# Patient Record
Sex: FEMALE | Race: OTHER | HISPANIC OR LATINO
[De-identification: names, ages, dates, MRNs, and addresses within clinical notes are randomized per-mention and may not be internally consistent; named-entity substitution may affect disease eponyms.]

---

## 2022-11-15 PROBLEM — Z00.00 ENCOUNTER FOR PREVENTIVE HEALTH EXAMINATION: Status: ACTIVE | Noted: 2022-11-15

## 2022-11-22 ENCOUNTER — APPOINTMENT (OUTPATIENT)
Dept: OBGYN | Facility: CLINIC | Age: 27
End: 2022-11-22

## 2022-11-22 VITALS
WEIGHT: 140 LBS | DIASTOLIC BLOOD PRESSURE: 76 MMHG | HEIGHT: 63 IN | SYSTOLIC BLOOD PRESSURE: 114 MMHG | BODY MASS INDEX: 24.8 KG/M2

## 2022-11-22 DIAGNOSIS — Z82.49 FAMILY HISTORY OF ISCHEMIC HEART DISEASE AND OTHER DISEASES OF THE CIRCULATORY SYSTEM: ICD-10-CM

## 2022-11-22 DIAGNOSIS — Z78.9 OTHER SPECIFIED HEALTH STATUS: ICD-10-CM

## 2022-11-22 PROCEDURE — 99202 OFFICE O/P NEW SF 15 MIN: CPT | Mod: 25

## 2022-11-22 PROCEDURE — 81025 URINE PREGNANCY TEST: CPT

## 2022-11-22 PROCEDURE — 99385 PREV VISIT NEW AGE 18-39: CPT

## 2022-11-22 RX ORDER — PRENATAL WITH FERROUS FUM AND FOLIC ACID 3080; 920; 120; 400; 22; 1.84; 3; 20; 10; 1; 12; 200; 27; 25; 2 [IU]/1; [IU]/1; MG/1; [IU]/1; MG/1; MG/1; MG/1; MG/1; MG/1; MG/1; UG/1; MG/1; MG/1; MG/1; MG/1
27-1 TABLET ORAL
Qty: 30 | Refills: 8 | Status: ACTIVE | COMMUNITY
Start: 2022-11-22 | End: 1900-01-01

## 2022-11-22 NOTE — PROCEDURE
[Cervical Pap Smear] : cervical Pap smear [Liquid Base] : liquid base [GC & Chlamydia via Pap] : GC & Chlamydia via Pap [Tolerated Well] : the patient tolerated the procedure well [No Complications] : there were no complications [Transvaginal OB Sonogram] : Transvaginal OB Sonogram [Intrauterine Pregnancy] : intrauterine pregnancy [Transvaginal OB Sonogram WNL] : Transvaginal OB Sonogram WNL [Yolk Sac] : no yolk sac [Fetal Heart] : no fetal heart [FreeTextEntry1] : IUP with gestational sac but NO fetal pole , NO FHR\par likely too early to date\par \par Ob sono in 3 weeks to obtain and confirm viability and dating

## 2022-11-22 NOTE — PHYSICAL EXAM
[Appropriately responsive] : appropriately responsive [Alert] : alert [No Acute Distress] : no acute distress [Regular Rate Rhythm] : regular rate rhythm [Soft] : soft [Non-tender] : non-tender [Non-distended] : non-distended [No HSM] : No HSM [No Lesions] : no lesions [No Mass] : no mass [Oriented x3] : oriented x3 [Examination Of The Breasts] : a normal appearance [No Masses] : no breast masses were palpable [Labia Majora] : normal [Labia Minora] : normal [Normal] : normal [Enlarged ___ wks] : enlarged [unfilled] ~Uweeks [Uterine Adnexae] : normal

## 2022-11-22 NOTE — PLAN
[FreeTextEntry1] : PLAN:\par \par IUP with gestational sac but NO fetal pole , NO FHR\par likely too early to date\par \par Ob sono in 3 weeks to obtain and confirm viability and dating. Referral given and dating OB sono appointment made\par \par HCG ordered\par PAP smear withh HPV done\par GC/CT- done\par \par Miscarriage precautions given\par \par RTO 3 weeks for NPN pending dating sono\par

## 2022-11-22 NOTE — HISTORY OF PRESENT ILLNESS
[N] : Patient does not use contraception [Y] : Positive pregnancy history [Frequency: Q ___ days] : menstrual periods occur approximately every [unfilled] days [Menarche Age: ____] : age at menarche was [unfilled] [FreeTextEntry1] : 26yo  presents to establish care and confirm pregnancy. ALso due for PAP smear\par \par LMP 10/17/2022\par \par Desired pregnancy\par Not taking prenatals\par Denies any vaginal bleeding, discharge or carmpaing\par \par OBHX:  , FT, male, no complications\par \par  [PGHxTotal] : 2 [[facility]] : 1 [PGHxPremature] : 0 [PGHxAbortions] : 0 [[facility]] : [facility] [PGHxABInduced] : 0 [PGHxABSpont] : 0 [PGHxEctopic] : 0 [PGHxMultBirths] : 0

## 2022-11-23 LAB — HCG SERPL-MCNC: 2290 MIU/ML

## 2022-11-25 LAB
C TRACH RRNA SPEC QL NAA+PROBE: NOT DETECTED
HPV HIGH+LOW RISK DNA PNL CVX: NOT DETECTED
N GONORRHOEA RRNA SPEC QL NAA+PROBE: NOT DETECTED
SOURCE TP AMPLIFICATION: NORMAL

## 2022-12-05 LAB — CYTOLOGY CVX/VAG DOC THIN PREP: NORMAL

## 2022-12-16 ENCOUNTER — APPOINTMENT (OUTPATIENT)
Dept: ANTEPARTUM | Facility: CLINIC | Age: 27
End: 2022-12-16

## 2022-12-16 ENCOUNTER — ASOB RESULT (OUTPATIENT)
Age: 27
End: 2022-12-16

## 2022-12-16 PROCEDURE — 76801 OB US < 14 WKS SINGLE FETUS: CPT

## 2022-12-22 ENCOUNTER — APPOINTMENT (OUTPATIENT)
Dept: OBGYN | Facility: CLINIC | Age: 27
End: 2022-12-22

## 2022-12-22 ENCOUNTER — NON-APPOINTMENT (OUTPATIENT)
Age: 27
End: 2022-12-22

## 2022-12-22 VITALS
BODY MASS INDEX: 26.59 KG/M2 | WEIGHT: 150.1 LBS | SYSTOLIC BLOOD PRESSURE: 100 MMHG | HEIGHT: 63 IN | DIASTOLIC BLOOD PRESSURE: 70 MMHG

## 2022-12-22 DIAGNOSIS — Z87.42 PERSONAL HISTORY OF OTHER DISEASES OF THE FEMALE GENITAL TRACT: ICD-10-CM

## 2022-12-22 DIAGNOSIS — Z32.01 ENCOUNTER FOR PREGNANCY TEST, RESULT POSITIVE: ICD-10-CM

## 2022-12-22 DIAGNOSIS — Z12.4 ENCOUNTER FOR SCREENING FOR MALIGNANT NEOPLASM OF CERVIX: ICD-10-CM

## 2022-12-22 DIAGNOSIS — Z86.19 PERSONAL HISTORY OF OTHER INFECTIOUS AND PARASITIC DISEASES: ICD-10-CM

## 2022-12-22 PROCEDURE — 81003 URINALYSIS AUTO W/O SCOPE: CPT | Mod: QW

## 2022-12-22 PROCEDURE — 0500F INITIAL PRENATAL CARE VISIT: CPT

## 2022-12-22 PROCEDURE — 36415 COLL VENOUS BLD VENIPUNCTURE: CPT

## 2022-12-22 RX ORDER — METOCLOPRAMIDE 10 MG/1
10 TABLET ORAL 4 TIMES DAILY
Qty: 120 | Refills: 1 | Status: ACTIVE | COMMUNITY
Start: 2022-12-22 | End: 1900-01-01

## 2022-12-28 LAB
ABO + RH PNL BLD: NORMAL
ALBUMIN SERPL ELPH-MCNC: 4.3 G/DL
ALP BLD-CCNC: 63 U/L
ALT SERPL-CCNC: 9 U/L
ANION GAP SERPL CALC-SCNC: 14 MMOL/L
APPEARANCE: CLEAR
AR GENE MUT ANL BLD/T: NORMAL
AST SERPL-CCNC: 15 U/L
B19V IGG SER QL IA: 5.93 INDEX
B19V IGG+IGM SER-IMP: NORMAL
B19V IGG+IGM SER-IMP: POSITIVE
B19V IGM FLD-ACNC: 0.1 INDEX
B19V IGM SER-ACNC: NEGATIVE
BASOPHILS # BLD AUTO: 0.06 K/UL
BASOPHILS NFR BLD AUTO: 0.6 %
BILIRUB SERPL-MCNC: 0.2 MG/DL
BILIRUBIN URINE: NEGATIVE
BLD GP AB SCN SERPL QL: NORMAL
BLOOD URINE: NEGATIVE
BUN SERPL-MCNC: 4 MG/DL
CALCIUM SERPL-MCNC: 9.2 MG/DL
CHLORIDE SERPL-SCNC: 98 MMOL/L
CMV IGG SERPL QL: 6 U/ML
CMV IGG SERPL-IMP: POSITIVE
CMV IGM SERPL QL: <8 AU/ML
CMV IGM SERPL QL: NEGATIVE
CO2 SERPL-SCNC: 21 MMOL/L
COLOR: YELLOW
CREAT SERPL-MCNC: 0.4 MG/DL
EGFR: 137 ML/MIN/1.73M2
EOSINOPHIL # BLD AUTO: 0.35 K/UL
EOSINOPHIL NFR BLD AUTO: 3.6 %
ESTIMATED AVERAGE GLUCOSE: 114 MG/DL
GLUCOSE QUALITATIVE U: NEGATIVE
GLUCOSE SERPL-MCNC: 57 MG/DL
HBA1C MFR BLD HPLC: 5.6 %
HBV SURFACE AG SER QL: NONREACTIVE
HCT VFR BLD CALC: 38 %
HCV AB SER QL: NONREACTIVE
HCV S/CO RATIO: 0.13 S/CO
HGB A MFR BLD: 97.3 %
HGB A2 MFR BLD: 2.7 %
HGB BLD-MCNC: 12.6 G/DL
HGB FRACT BLD-IMP: NORMAL
HIV1+2 AB SPEC QL IA.RAPID: NONREACTIVE
IMM GRANULOCYTES NFR BLD AUTO: 0.3 %
KETONES URINE: NEGATIVE
LEAD BLD-MCNC: <1 UG/DL
LEUKOCYTE ESTERASE URINE: NEGATIVE
LYMPHOCYTES # BLD AUTO: 3.32 K/UL
LYMPHOCYTES NFR BLD AUTO: 33.9 %
M TB IFN-G BLD-IMP: NEGATIVE
MAN DIFF?: NORMAL
MCHC RBC-ENTMCNC: 31.2 PG
MCHC RBC-ENTMCNC: 33.2 GM/DL
MCV RBC AUTO: 94.1 FL
MEV IGG FLD QL IA: 16.3 AU/ML
MEV IGG+IGM SER-IMP: NORMAL
MONOCYTES # BLD AUTO: 0.75 K/UL
MONOCYTES NFR BLD AUTO: 7.7 %
MUV AB SER-ACNC: POSITIVE
MUV IGG SER QL IA: 41.2 AU/ML
NEUTROPHILS # BLD AUTO: 5.29 K/UL
NEUTROPHILS NFR BLD AUTO: 53.9 %
NITRITE URINE: NEGATIVE
PH URINE: 6
PLATELET # BLD AUTO: 401 K/UL
POTASSIUM SERPL-SCNC: 4.9 MMOL/L
PROT SERPL-MCNC: 7.4 G/DL
PROTEIN URINE: NEGATIVE
QUANTIFERON TB PLUS MITOGEN MINUS NIL: >10 IU/ML
QUANTIFERON TB PLUS NIL: 0.02 IU/ML
QUANTIFERON TB PLUS TB1 MINUS NIL: 0 IU/ML
QUANTIFERON TB PLUS TB2 MINUS NIL: 0 IU/ML
RBC # BLD: 4.04 M/UL
RBC # FLD: 13.5 %
RUBV IGG FLD-ACNC: 3.8 INDEX
RUBV IGG SER-IMP: POSITIVE
SODIUM SERPL-SCNC: 134 MMOL/L
SPECIFIC GRAVITY URINE: 1.02
T GONDII AB SER-IMP: NEGATIVE
T GONDII AB SER-IMP: POSITIVE
T GONDII IGG SER QL: 66.4 IU/ML
T GONDII IGM SER QL: <3 AU/ML
T PALLIDUM AB SER QL IA: NEGATIVE
TSH SERPL-ACNC: 1.89 UIU/ML
UROBILINOGEN URINE: NORMAL
VZV AB TITR SER: POSITIVE
VZV IGG SER IF-ACNC: 345 INDEX
WBC # FLD AUTO: 9.8 K/UL

## 2022-12-30 LAB — FMR1 GENE MUT ANL BLD/T: NORMAL

## 2023-01-05 ENCOUNTER — ASOB RESULT (OUTPATIENT)
Age: 28
End: 2023-01-05

## 2023-01-05 ENCOUNTER — APPOINTMENT (OUTPATIENT)
Dept: MATERNAL FETAL MEDICINE | Facility: CLINIC | Age: 28
End: 2023-01-05
Payer: MEDICAID

## 2023-01-05 PROCEDURE — 99442: CPT

## 2023-01-09 ENCOUNTER — APPOINTMENT (OUTPATIENT)
Dept: ANTEPARTUM | Facility: CLINIC | Age: 28
End: 2023-01-09
Payer: MEDICAID

## 2023-01-09 ENCOUNTER — ASOB RESULT (OUTPATIENT)
Age: 28
End: 2023-01-09

## 2023-01-09 PROCEDURE — 36415 COLL VENOUS BLD VENIPUNCTURE: CPT

## 2023-01-09 PROCEDURE — 76813 OB US NUCHAL MEAS 1 GEST: CPT

## 2023-01-12 LAB
ADDITIONAL US: NORMAL
COMMENTS: AFP: NORMAL
CRL SCAN TWIN B: NORMAL
CRL SCAN: NORMAL
CROWN RUMP LENGTH TWIN B: NORMAL
CROWN RUMP LENGTH: 55.8 MM
DOWN SYNDROME AGE RISK: NORMAL
DOWN SYNDROME INTERPRETATION: NORMAL
DOWN SYNDROME SCREENING RISK: NORMAL
GEST. AGE ON COLLECTION DATE: 12 WEEKS
HCG MOM: 0.69
HCG VALUE: 75.7 IU/ML
MATERNAL AGE AT EDD: 30.3 YR
NOTE: AFP: NORMAL
NT MOM TWIN B: NORMAL
NT TWIN B: NORMAL
NUCHAL TRANSLUCENCY (NT): 1 MM
NUCHAL TRANSLUCENCY MOM: 0.77
NUMBER OF FETUSES: 1
PAPP-A MOM: 0.89
PAPP-A VALUE: 766.8 NG/ML
RACE: NORMAL
RESULTS AFP: NORMAL
SONOGRAPHER ID#: NORMAL
SUBMIT PART 2 SAMPLE USING: NORMAL
TEST RESULTS: AFP: NORMAL
TRISOMY 18 AGE RISK: NORMAL
TRISOMY 18 INTERPRETATION: NORMAL
TRISOMY 18 SCREENING RISK: NORMAL
WEIGHT AFP: 140 LBS

## 2023-01-16 ENCOUNTER — NON-APPOINTMENT (OUTPATIENT)
Age: 28
End: 2023-01-16

## 2023-01-26 ENCOUNTER — APPOINTMENT (OUTPATIENT)
Dept: OBGYN | Facility: CLINIC | Age: 28
End: 2023-01-26
Payer: MEDICAID

## 2023-01-26 VITALS
SYSTOLIC BLOOD PRESSURE: 110 MMHG | WEIGHT: 157.44 LBS | DIASTOLIC BLOOD PRESSURE: 60 MMHG | BODY MASS INDEX: 27.89 KG/M2 | HEIGHT: 63 IN

## 2023-01-26 PROCEDURE — 0502F SUBSEQUENT PRENATAL CARE: CPT

## 2023-02-03 ENCOUNTER — NON-APPOINTMENT (OUTPATIENT)
Age: 28
End: 2023-02-03

## 2023-02-03 RX ORDER — METRONIDAZOLE 7.5 MG/G
0.75 GEL VAGINAL
Qty: 1 | Refills: 1 | Status: ACTIVE | COMMUNITY
Start: 2023-02-03 | End: 1900-01-01

## 2023-02-06 ENCOUNTER — APPOINTMENT (OUTPATIENT)
Dept: ANTEPARTUM | Facility: CLINIC | Age: 28
End: 2023-02-06
Payer: MEDICAID

## 2023-02-06 PROCEDURE — 36415 COLL VENOUS BLD VENIPUNCTURE: CPT

## 2023-02-09 ENCOUNTER — ASOB RESULT (OUTPATIENT)
Age: 28
End: 2023-02-09

## 2023-02-09 ENCOUNTER — APPOINTMENT (OUTPATIENT)
Dept: MATERNAL FETAL MEDICINE | Facility: CLINIC | Age: 28
End: 2023-02-09
Payer: MEDICAID

## 2023-02-09 PROCEDURE — 99442: CPT

## 2023-02-13 LAB
ADDITIONAL US: NORMAL
AFP MOM: 1.03
AFP VALUE: 32.9 NG/ML
COLLECTED ON 2: NORMAL
COLLECTED ON: NORMAL
CRL SCAN TWIN B: NORMAL
CRL SCAN: NORMAL
CROWN RUMP LENGTH TWIN B: NORMAL
CROWN RUMP LENGTH: 55.8 MM
DIA MOM: 0.9
DIA VALUE: 139.6 PG/ML
DOWN SYNDROME AGE RISK: NORMAL
DOWN SYNDROME INTERPRETATION: NORMAL
DOWN SYNDROME SCREENING RISK: NORMAL
FIRST TRIMESTER SAMPLE: NORMAL
GEST. AGE ON COLLECTION DATE: 12 WEEKS
GESTATIONAL AGE: 16 WEEKS
HCG MOM: 0.86
HCG VALUE: 32.7 IU/ML
INSULIN DEP DIABETES: NO
MATERNAL AGE AT EDD: 30.3 YR
NT MOM TWIN B: NORMAL
NT TWIN B: NORMAL
NUCHAL TRANSLUCENCY (NT): 1 MM
NUCHAL TRANSLUCENCY MOM: 0.77
NUMBER OF FETUSES: 1
OPEN SPINA BIFIDA: NORMAL
OSB INTERPRETATION: NORMAL
PAPP-A MOM: 0.89
PAPP-A VALUE: 766.8 NG/ML
RACE: NORMAL
SECOND TRIMESTER SAMPLE: NORMAL
SEQUENTIAL 2 COMMENTS: NORMAL
SEQUENTIAL 2 NOTE: NORMAL
SEQUENTIAL 2 RESULTS: NORMAL
SEQUENTIAL 2 TEST RESULTS: NORMAL
SONOGRAPHER ID#: NORMAL
TRISOMY 18 AGE RISK: NORMAL
TRISOMY 18 INTERPRETATION: NORMAL
TRISOMY 18 SCREENING RISK: NORMAL
UE3 MOM: 0.81
UE3 VALUE: 0.76 NG/ML
WEIGHT AFP: 140 LBS
WEIGHT: 158 LBS

## 2023-02-17 ENCOUNTER — APPOINTMENT (OUTPATIENT)
Dept: OBGYN | Facility: CLINIC | Age: 28
End: 2023-02-17
Payer: MEDICAID

## 2023-02-17 VITALS
BODY MASS INDEX: 28.17 KG/M2 | SYSTOLIC BLOOD PRESSURE: 120 MMHG | DIASTOLIC BLOOD PRESSURE: 72 MMHG | WEIGHT: 159 LBS | HEIGHT: 63 IN

## 2023-02-17 PROCEDURE — 0502F SUBSEQUENT PRENATAL CARE: CPT

## 2023-03-09 ENCOUNTER — APPOINTMENT (OUTPATIENT)
Dept: ANTEPARTUM | Facility: CLINIC | Age: 28
End: 2023-03-09
Payer: MEDICAID

## 2023-03-09 ENCOUNTER — ASOB RESULT (OUTPATIENT)
Age: 28
End: 2023-03-09

## 2023-03-09 PROCEDURE — 76817 TRANSVAGINAL US OBSTETRIC: CPT | Mod: 59

## 2023-03-09 PROCEDURE — 76805 OB US >/= 14 WKS SNGL FETUS: CPT

## 2023-03-16 ENCOUNTER — APPOINTMENT (OUTPATIENT)
Dept: OBGYN | Facility: CLINIC | Age: 28
End: 2023-03-16
Payer: MEDICAID

## 2023-03-16 VITALS
BODY MASS INDEX: 29.46 KG/M2 | WEIGHT: 166.25 LBS | DIASTOLIC BLOOD PRESSURE: 76 MMHG | SYSTOLIC BLOOD PRESSURE: 126 MMHG | HEIGHT: 63 IN

## 2023-03-16 PROCEDURE — 0502F SUBSEQUENT PRENATAL CARE: CPT

## 2023-04-04 DIAGNOSIS — Z3A.09 9 WEEKS GESTATION OF PREGNANCY: ICD-10-CM

## 2023-04-12 LAB — CFTR MUT TESTED BLD/T: NEGATIVE

## 2023-04-13 ENCOUNTER — APPOINTMENT (OUTPATIENT)
Dept: OBGYN | Facility: CLINIC | Age: 28
End: 2023-04-13
Payer: MEDICAID

## 2023-04-13 VITALS
BODY MASS INDEX: 29.95 KG/M2 | WEIGHT: 169 LBS | DIASTOLIC BLOOD PRESSURE: 60 MMHG | SYSTOLIC BLOOD PRESSURE: 100 MMHG | HEIGHT: 63 IN

## 2023-04-13 PROCEDURE — 0502F SUBSEQUENT PRENATAL CARE: CPT

## 2023-04-14 LAB
BASOPHILS # BLD AUTO: 0.02 K/UL
BASOPHILS NFR BLD AUTO: 0.3 %
EOSINOPHIL # BLD AUTO: 0.31 K/UL
EOSINOPHIL NFR BLD AUTO: 4 %
GLUCOSE 1H P 50 G GLC PO SERPL-MCNC: 170 MG/DL
HCT VFR BLD CALC: 33.4 %
HGB BLD-MCNC: 11 G/DL
IMM GRANULOCYTES NFR BLD AUTO: 0.3 %
LYMPHOCYTES # BLD AUTO: 1.89 K/UL
LYMPHOCYTES NFR BLD AUTO: 24.4 %
MAN DIFF?: NORMAL
MCHC RBC-ENTMCNC: 31.6 PG
MCHC RBC-ENTMCNC: 32.9 GM/DL
MCV RBC AUTO: 96 FL
MONOCYTES # BLD AUTO: 0.6 K/UL
MONOCYTES NFR BLD AUTO: 7.7 %
NEUTROPHILS # BLD AUTO: 4.92 K/UL
NEUTROPHILS NFR BLD AUTO: 63.3 %
PLATELET # BLD AUTO: 274 K/UL
RBC # BLD: 3.48 M/UL
RBC # FLD: 13.1 %
WBC # FLD AUTO: 7.76 K/UL

## 2023-04-14 RX ORDER — FERROUS SULFATE TAB EC 325 MG (65 MG FE EQUIVALENT) 325 (65 FE) MG
325 (65 FE) TABLET DELAYED RESPONSE ORAL
Qty: 90 | Refills: 2 | Status: ACTIVE | COMMUNITY
Start: 2023-04-14 | End: 1900-01-01

## 2023-04-17 ENCOUNTER — NON-APPOINTMENT (OUTPATIENT)
Age: 28
End: 2023-04-17

## 2023-04-27 ENCOUNTER — NON-APPOINTMENT (OUTPATIENT)
Age: 28
End: 2023-04-27

## 2023-05-04 ENCOUNTER — APPOINTMENT (OUTPATIENT)
Dept: OBGYN | Facility: CLINIC | Age: 28
End: 2023-05-04
Payer: MEDICAID

## 2023-05-04 VITALS
WEIGHT: 170 LBS | DIASTOLIC BLOOD PRESSURE: 70 MMHG | HEIGHT: 63 IN | SYSTOLIC BLOOD PRESSURE: 100 MMHG | BODY MASS INDEX: 30.12 KG/M2

## 2023-05-04 LAB
BILIRUB UR QL STRIP: NORMAL
GLUCOSE UR-MCNC: NORMAL
HCG UR QL: 0.2 EU/DL
HGB UR QL STRIP.AUTO: ABNORMAL
KETONES UR-MCNC: NORMAL
LEUKOCYTE ESTERASE UR QL STRIP: ABNORMAL
NITRITE UR QL STRIP: NORMAL
PH UR STRIP: 5.5
PROT UR STRIP-MCNC: NORMAL
SP GR UR STRIP: 1.03

## 2023-05-04 PROCEDURE — 0502F SUBSEQUENT PRENATAL CARE: CPT

## 2023-05-12 ENCOUNTER — ASOB RESULT (OUTPATIENT)
Age: 28
End: 2023-05-12

## 2023-05-12 ENCOUNTER — APPOINTMENT (OUTPATIENT)
Dept: ANTEPARTUM | Facility: CLINIC | Age: 28
End: 2023-05-12
Payer: MEDICAID

## 2023-05-12 PROCEDURE — 76816 OB US FOLLOW-UP PER FETUS: CPT

## 2023-05-17 LAB
BILIRUB UR QL STRIP: NORMAL
CLARITY UR: CLEAR
COLLECTION METHOD: NORMAL
GLUCOSE UR-MCNC: NORMAL
HCG UR QL: 0.2 EU/DL
HGB UR QL STRIP.AUTO: NORMAL
KETONES UR-MCNC: NORMAL
LEUKOCYTE ESTERASE UR QL STRIP: NORMAL
NITRITE UR QL STRIP: NORMAL
PH UR STRIP: 5.5
PROT UR STRIP-MCNC: NORMAL
SP GR UR STRIP: 1.03

## 2023-05-26 ENCOUNTER — APPOINTMENT (OUTPATIENT)
Dept: OBGYN | Facility: CLINIC | Age: 28
End: 2023-05-26
Payer: MEDICAID

## 2023-05-26 VITALS
HEIGHT: 63 IN | WEIGHT: 170.13 LBS | DIASTOLIC BLOOD PRESSURE: 60 MMHG | SYSTOLIC BLOOD PRESSURE: 98 MMHG | BODY MASS INDEX: 30.14 KG/M2

## 2023-05-26 LAB
BILIRUB UR QL STRIP: NORMAL
GLUCOSE UR-MCNC: NORMAL
HCG UR QL: 0.2 EU/DL
HGB UR QL STRIP.AUTO: NORMAL
KETONES UR-MCNC: NORMAL
LEUKOCYTE ESTERASE UR QL STRIP: NORMAL
NITRITE UR QL STRIP: NORMAL
PH UR STRIP: 7
PROT UR STRIP-MCNC: NORMAL
SP GR UR STRIP: 1.02

## 2023-05-26 PROCEDURE — 0502F SUBSEQUENT PRENATAL CARE: CPT

## 2023-06-01 ENCOUNTER — ASOB RESULT (OUTPATIENT)
Age: 28
End: 2023-06-01

## 2023-06-01 ENCOUNTER — APPOINTMENT (OUTPATIENT)
Dept: ANTEPARTUM | Facility: CLINIC | Age: 28
End: 2023-06-01
Payer: MEDICAID

## 2023-06-01 PROCEDURE — 76819 FETAL BIOPHYS PROFIL W/O NST: CPT

## 2023-06-09 ENCOUNTER — APPOINTMENT (OUTPATIENT)
Dept: OBGYN | Facility: CLINIC | Age: 28
End: 2023-06-09
Payer: MEDICAID

## 2023-06-09 ENCOUNTER — RESULT CHARGE (OUTPATIENT)
Age: 28
End: 2023-06-09

## 2023-06-09 VITALS
SYSTOLIC BLOOD PRESSURE: 110 MMHG | WEIGHT: 175.13 LBS | DIASTOLIC BLOOD PRESSURE: 70 MMHG | HEIGHT: 63 IN | BODY MASS INDEX: 31.03 KG/M2

## 2023-06-09 LAB
BILIRUB UR QL STRIP: NORMAL
CLARITY UR: CLEAR
COLLECTION METHOD: NORMAL
GLUCOSE UR-MCNC: NORMAL
HCG UR QL: 0.2 EU/DL
HGB UR QL STRIP.AUTO: NORMAL
KETONES UR-MCNC: NORMAL
LEUKOCYTE ESTERASE UR QL STRIP: NORMAL
NITRITE UR QL STRIP: NORMAL
PH UR STRIP: 6
PROT UR STRIP-MCNC: NORMAL
SP GR UR STRIP: 1.03

## 2023-06-09 PROCEDURE — 0502F SUBSEQUENT PRENATAL CARE: CPT

## 2023-06-22 ENCOUNTER — APPOINTMENT (OUTPATIENT)
Dept: OBGYN | Facility: CLINIC | Age: 28
End: 2023-06-22
Payer: MEDICAID

## 2023-06-22 VITALS
DIASTOLIC BLOOD PRESSURE: 60 MMHG | SYSTOLIC BLOOD PRESSURE: 100 MMHG | WEIGHT: 176 LBS | BODY MASS INDEX: 31.18 KG/M2 | HEIGHT: 63 IN

## 2023-06-22 DIAGNOSIS — N76.0 ACUTE VAGINITIS: ICD-10-CM

## 2023-06-22 DIAGNOSIS — Z13.71 ENCOUNTER FOR NONPROCREATIVE SCREENING FOR GENETIC DISEASE CARRIER STATUS: ICD-10-CM

## 2023-06-22 DIAGNOSIS — Z34.92 ENCOUNTER FOR SUPERVISION OF NORMAL PREGNANCY, UNSPECIFIED, SECOND TRIMESTER: ICD-10-CM

## 2023-06-22 DIAGNOSIS — B96.89 ACUTE VAGINITIS: ICD-10-CM

## 2023-06-22 DIAGNOSIS — Z13.1 ENCOUNTER FOR SCREENING FOR DIABETES MELLITUS: ICD-10-CM

## 2023-06-22 LAB
BILIRUB UR QL STRIP: NORMAL
GLUCOSE UR-MCNC: NORMAL
HCG UR QL: 0.2 EU/DL
HGB UR QL STRIP.AUTO: NORMAL
KETONES UR-MCNC: NORMAL
LEUKOCYTE ESTERASE UR QL STRIP: NORMAL
NITRITE UR QL STRIP: NORMAL
PH UR STRIP: 6
PROT UR STRIP-MCNC: NORMAL
SP GR UR STRIP: 1.03

## 2023-06-22 PROCEDURE — 0502F SUBSEQUENT PRENATAL CARE: CPT

## 2023-06-22 PROCEDURE — 90471 IMMUNIZATION ADMIN: CPT

## 2023-06-22 PROCEDURE — 90715 TDAP VACCINE 7 YRS/> IM: CPT

## 2023-06-29 ENCOUNTER — APPOINTMENT (OUTPATIENT)
Dept: OBGYN | Facility: CLINIC | Age: 28
End: 2023-06-29
Payer: MEDICAID

## 2023-06-29 VITALS
DIASTOLIC BLOOD PRESSURE: 72 MMHG | HEIGHT: 63 IN | SYSTOLIC BLOOD PRESSURE: 112 MMHG | WEIGHT: 178 LBS | BODY MASS INDEX: 31.54 KG/M2

## 2023-06-29 DIAGNOSIS — Z3A.35 35 WEEKS GESTATION OF PREGNANCY: ICD-10-CM

## 2023-06-29 PROCEDURE — 0502F SUBSEQUENT PRENATAL CARE: CPT

## 2023-07-04 LAB
BILIRUB UR QL STRIP: NORMAL
CANDIDA VAG CYTO: DETECTED
G VAGINALIS+PREV SP MTYP VAG QL MICRO: NOT DETECTED
GLUCOSE UR-MCNC: NORMAL
GP B STREP DNA SPEC QL NAA+PROBE: NOT DETECTED
HCG UR QL: 0.2 EU/DL
HCT VFR BLD CALC: 36.6 %
HGB BLD-MCNC: 11.8 G/DL
HGB UR QL STRIP.AUTO: NORMAL
HIV1+2 AB SPEC QL IA.RAPID: NONREACTIVE
KETONES UR-MCNC: NORMAL
LEUKOCYTE ESTERASE UR QL STRIP: NORMAL
MCHC RBC-ENTMCNC: 30.9 PG
MCHC RBC-ENTMCNC: 32.2 GM/DL
MCV RBC AUTO: 95.8 FL
NITRITE UR QL STRIP: NORMAL
PH UR STRIP: 7.5
PLATELET # BLD AUTO: 244 K/UL
PROT UR STRIP-MCNC: NORMAL
RBC # BLD: 3.82 M/UL
RBC # FLD: 13.5 %
SOURCE GBS: NORMAL
SP GR UR STRIP: 1.01
T PALLIDUM AB SER QL IA: NEGATIVE
T VAGINALIS VAG QL WET PREP: NOT DETECTED
WBC # FLD AUTO: 7.55 K/UL

## 2023-07-04 RX ORDER — CLOTRIMAZOLE 1 %
1 CREAM WITH APPLICATOR VAGINAL
Qty: 1 | Refills: 1 | Status: ACTIVE | COMMUNITY
Start: 2023-07-04 | End: 1900-01-01

## 2023-07-06 ENCOUNTER — APPOINTMENT (OUTPATIENT)
Dept: OBGYN | Facility: CLINIC | Age: 28
End: 2023-07-06
Payer: MEDICAID

## 2023-07-06 VITALS
SYSTOLIC BLOOD PRESSURE: 100 MMHG | WEIGHT: 180.06 LBS | BODY MASS INDEX: 31.9 KG/M2 | HEIGHT: 63 IN | DIASTOLIC BLOOD PRESSURE: 70 MMHG

## 2023-07-06 DIAGNOSIS — Z3A.36 36 WEEKS GESTATION OF PREGNANCY: ICD-10-CM

## 2023-07-06 PROCEDURE — 0502F SUBSEQUENT PRENATAL CARE: CPT

## 2023-07-07 LAB
BILIRUB UR QL STRIP: NORMAL
GLUCOSE UR-MCNC: NORMAL
HCG UR QL: 0.2 EU/DL
HGB UR QL STRIP.AUTO: NORMAL
KETONES UR-MCNC: NORMAL
LEUKOCYTE ESTERASE UR QL STRIP: NORMAL
NITRITE UR QL STRIP: NORMAL
PH UR STRIP: 7.5
PROT UR STRIP-MCNC: NORMAL
SP GR UR STRIP: 1.02

## 2023-07-13 ENCOUNTER — APPOINTMENT (OUTPATIENT)
Dept: OBGYN | Facility: CLINIC | Age: 28
End: 2023-07-13
Payer: MEDICAID

## 2023-07-13 VITALS
HEIGHT: 63 IN | DIASTOLIC BLOOD PRESSURE: 68 MMHG | BODY MASS INDEX: 31.24 KG/M2 | SYSTOLIC BLOOD PRESSURE: 100 MMHG | WEIGHT: 176.3 LBS

## 2023-07-13 PROCEDURE — 0502F SUBSEQUENT PRENATAL CARE: CPT

## 2023-07-14 LAB
BILIRUB UR QL STRIP: NORMAL
GLUCOSE UR-MCNC: NORMAL
HCG UR QL: 0.2 EU/DL
HGB UR QL STRIP.AUTO: NORMAL
KETONES UR-MCNC: NORMAL
LEUKOCYTE ESTERASE UR QL STRIP: ABNORMAL
NITRITE UR QL STRIP: NORMAL
PH UR STRIP: 6.5
PROT UR STRIP-MCNC: NORMAL
SP GR UR STRIP: 1.02

## 2023-07-20 ENCOUNTER — NON-APPOINTMENT (OUTPATIENT)
Age: 28
End: 2023-07-20

## 2023-07-20 ENCOUNTER — APPOINTMENT (OUTPATIENT)
Dept: OBGYN | Facility: CLINIC | Age: 28
End: 2023-07-20
Payer: MEDICAID

## 2023-07-20 ENCOUNTER — OUTPATIENT (OUTPATIENT)
Dept: OUTPATIENT SERVICES | Facility: HOSPITAL | Age: 28
LOS: 1 days | End: 2023-07-20
Payer: MEDICAID

## 2023-07-20 VITALS — DIASTOLIC BLOOD PRESSURE: 72 MMHG | HEART RATE: 77 BPM | SYSTOLIC BLOOD PRESSURE: 120 MMHG

## 2023-07-20 VITALS
DIASTOLIC BLOOD PRESSURE: 70 MMHG | WEIGHT: 180 LBS | BODY MASS INDEX: 31.89 KG/M2 | HEIGHT: 63 IN | SYSTOLIC BLOOD PRESSURE: 116 MMHG

## 2023-07-20 DIAGNOSIS — Z3A.37 37 WEEKS GESTATION OF PREGNANCY: ICD-10-CM

## 2023-07-20 DIAGNOSIS — Z3A.38 38 WEEKS GESTATION OF PREGNANCY: ICD-10-CM

## 2023-07-20 DIAGNOSIS — O26.899 OTHER SPECIFIED PREGNANCY RELATED CONDITIONS, UNSPECIFIED TRIMESTER: ICD-10-CM

## 2023-07-20 LAB
BILIRUB UR QL STRIP: NORMAL
GLUCOSE UR-MCNC: 100
HCG UR QL: 0.2 EU/DL
HGB UR QL STRIP.AUTO: NORMAL
KETONES UR-MCNC: NORMAL
LEUKOCYTE ESTERASE UR QL STRIP: NORMAL
NITRITE UR QL STRIP: NORMAL
PH UR STRIP: 6.5
PROT UR STRIP-MCNC: NORMAL
SP GR UR STRIP: 1.02

## 2023-07-20 PROCEDURE — 59025 FETAL NON-STRESS TEST: CPT

## 2023-07-20 PROCEDURE — 59025 FETAL NON-STRESS TEST: CPT | Mod: 59

## 2023-07-20 PROCEDURE — 99212 OFFICE O/P EST SF 10 MIN: CPT | Mod: GC

## 2023-07-20 PROCEDURE — 0502F SUBSEQUENT PRENATAL CARE: CPT

## 2023-07-20 PROCEDURE — G0463: CPT

## 2023-07-20 NOTE — OB PROVIDER TRIAGE NOTE - HISTORY OF PRESENT ILLNESS
TALA STALLWORTH is a 28y  at 39w3d GA who presents to L&D for self-reported decreased fetal movement of one week's duration. LMP 10/17/22, DREW 23. She states that she only felt the baby move x3 today. She reports that normally, the baby moves so many times per day that she cannot count how many times she feels it move. She states she has headaches during pregnancy, but denies high BP or hypertensive episodes.    Pt denies vaginal bleeding, contractions and leakage of fluid.     Pt denies visual disturbances, RUQ pain, epigastric pain and new-onset edema.     She denies fevers, chills, nausea, vomiting.     She denies shortness of breath, chest pain, and palpitations.    Pregnancy course is significant for an episode of vaginal bleeding two months into pregnancy that prompted the patient to go to the hospital. She reports that it was 2/2 a fibroid.     POB:  G1  @40w (male, 7lb8oz, )  PGYN: +fibroids/-cysts, denied STD hx, denies abnormal PAPs  PMH: Denies  PSH: Denies  SH: Denies tobacco use, EtOH use and illicit drug use during the pregnancy; Feels safe at home  Meds: PNVs  All: NKDA TALA STALLWORTH is a 28y  at 39w3d GA who presents to L&D for decreased fetal movement of one week's duration. LMP 10/17/22, DREW 23. She states that she only felt the baby move x3 today. She reports that normally, the baby moves so many times per day that she cannot count how many times she feels it move. She states she has headaches during pregnancy, but denies high BP or hypertensive episodes.    Pt denies vaginal bleeding, contractions and leakage of fluid.     Pt denies visual disturbances, RUQ pain, epigastric pain and new-onset edema.     She denies fevers, chills, nausea, vomiting.     She denies shortness of breath, chest pain, and palpitations.    Pregnancy course is significant for an episode of vaginal bleeding two months into pregnancy that prompted the patient to go to the hospital. She reports that it was 2/2 a fibroid.     POB:  G1  @40w (male, 7lb8oz, )  PGYN: +fibroids/-cysts, denied STD hx, denies abnormal PAPs  PMH: Denies  PSH: Denies  SH: Denies tobacco use, EtOH use and illicit drug use during the pregnancy; Feels safe at home  Meds: PNVs  All: NKDA

## 2023-07-20 NOTE — OB PROVIDER TRIAGE NOTE - NSOBPROVIDERNOTE_OBGYN_ALL_OB_FT
A/P: TALA STALLWORTH is a 28y  at 39w3d GA who presents to L&D for self-reported decreased fetal movement of one week's duration.  - BPP to assess fetal movement       Dispo: Continue to observe.     Discussed with  A/P: TALA STALLWORTH is a 28y  at 39w3d GA evaluated for decreased fetal movement found to have a reactive NST and a BPP 10/10. Patient given strict return precautions. Patient advised to follow up with her OBGYN tomorrow morning for a follow up appointment and possible term IOL if she does not go into labor on her own.     Discussed with Dr. Denny A/P: TALA STALLWORTH is a 28y  at 39w3d GA evaluated for decreased fetal movement now resolved,found to have a reactive NST and a BPP 10/10. Patient given strict return precautions. Patient advised to follow up with her OBGYN tomorrow morning for a follow up appointment and possible term IOL if she does not go into labor on her own.     Discussed with Dr. Denny

## 2023-07-20 NOTE — OB PROVIDER TRIAGE NOTE - NSHPPHYSICALEXAM_GEN_ALL_CORE
HR: 83 (07-20-23 @ 22:07) (77 - 83)  BP: 117/68 (07-20-23 @ 22:07) (117/68 - 120/72)  Gen: NAD, well-appearing  Abd: soft, gravid    FHT:   [place]: Vital Signs Last 24 Hrs  HR: 83 (07-20-23 @ 22:07) (77 - 83)  BP: 117/68 (07-20-23 @ 22:07) (117/68 - 120/72)      Gen: NAD, well-appearing  Abd: soft, gravid  SVE: 0/0/-3  FHT: 150 bpm, moderate variability, - accels, + 1 variable decel  [place]: none  Bedside US: vertex, anterior placenta, RAUL 8.41, +gross movement, + tone, + breathing Vital Signs Last 24 Hrs  HR: 83 (07-20-23 @ 22:07) (77 - 83)  BP: 117/68 (07-20-23 @ 22:07) (117/68 - 120/72)      Gen: NAD, well-appearing  Abd: soft, gravid  SVE: 0/0/-3  FHT: 150 bpm, moderate variability, - accels, + 1 variable decel  [place]: none  Bedside US: vertex, anterior placenta, RAUL 8.41, +gross movement, + tone, + breathing 8/8

## 2023-07-20 NOTE — OB PROVIDER TRIAGE NOTE - ATTENDING COMMENTS
28y  at 39w3d GA who presents to L&D for decreased fetal movement of one week's duration now resolved with reassuring fetal testing, return precautions discussed.  Impression NST: reactive  BPP: 10/10

## 2023-07-21 VITALS
RESPIRATION RATE: 17 BRPM | HEART RATE: 82 BPM | SYSTOLIC BLOOD PRESSURE: 120 MMHG | TEMPERATURE: 98 F | DIASTOLIC BLOOD PRESSURE: 72 MMHG

## 2023-07-21 DIAGNOSIS — Z3A.39 39 WEEKS GESTATION OF PREGNANCY: ICD-10-CM

## 2023-07-21 DIAGNOSIS — O26.893 OTHER SPECIFIED PREGNANCY RELATED CONDITIONS, THIRD TRIMESTER: ICD-10-CM

## 2023-07-21 DIAGNOSIS — O99.891 OTHER SPECIFIED DISEASES AND CONDITIONS COMPLICATING PREGNANCY: ICD-10-CM

## 2023-07-21 DIAGNOSIS — D21.9 BENIGN NEOPLASM OF CONNECTIVE AND OTHER SOFT TISSUE, UNSPECIFIED: ICD-10-CM

## 2023-07-21 DIAGNOSIS — R51.9 HEADACHE, UNSPECIFIED: ICD-10-CM

## 2023-07-21 DIAGNOSIS — O36.8130 DECREASED FETAL MOVEMENTS, THIRD TRIMESTER, NOT APPLICABLE OR UNSPECIFIED: ICD-10-CM

## 2023-07-21 NOTE — OB RN TRIAGE NOTE - FALL HARM RISK - UNIVERSAL INTERVENTIONS
Bed in lowest position, wheels locked, appropriate side rails in place/Call bell, personal items and telephone in reach/Instruct patient to call for assistance before getting out of bed or chair/Non-slip footwear when patient is out of bed/[place] to call system/Physically safe environment - no spills, clutter or unnecessary equipment/Purposeful Proactive Rounding/Room/bathroom lighting operational, light cord in reach

## 2023-07-21 NOTE — OB RN TRIAGE NOTE - AS PAIN REST
0 (no pain/absence of nonverbal indicators of pain) Double O-Z Flap Text: The defect edges were debeveled with a #15 scalpel blade.  Given the location of the defect, shape of the defect and the proximity to free margins a Double O-Z flap was deemed most appropriate.  Using a sterile surgical marker, an appropriate transposition flap was drawn incorporating the defect and placing the expected incisions within the relaxed skin tension lines where possible. The area thus outlined was incised deep to adipose tissue with a #15 scalpel blade.  The skin margins were undermined to an appropriate distance in all directions utilizing iris scissors.

## 2023-07-21 NOTE — OB RN TRIAGE NOTE - NS_BABIESUTERO_OBGYN_ALL_OB_NU
To Dr Delgado: Please advise, refill does not have or meet protocol(s).    Last Physician/APC office visit date in department: 8/10/2    When is next follow up recommended*?: Note not done    Any outstanding tests*?: No    Was the last refill an exception?: No - if YES, provide details  *If a visit or tests are overdue, notify patient and schedule if applicable*    For APC prescriptions, is the collaborating Physician listed within Rx? N/A  I personally verified medication is pended including sig and quantity Yes  I personally verified preferred pharmacy is selected Yes  I personally verified that I cannot authorize refills/exception refills based on protocol provisions. Yes    Copy details of last refill:   metFORMIN (GLUCOPHAGE-XR) 500 MG 24 hr tablet 180 tablet 1 9/26/2020     Sig - Route: Take 1 tablet by mouth 2 times daily (with meals). - Oral          Requested Prescriptions   Pending Prescriptions Disp Refills   • metFORMIN (GLUCOPHAGE-XR) 500 MG 24 hr tablet [Pharmacy Med Name: METFORMIN ER 500MG 24HR TABS] 180 tablet 1     Sig: TAKE 1 TABLET BY MOUTH TWICE DAILY WITH MEALS       Metformin Antihyperglycemics Refill Protocol Failed - 8/21/2021  3:33 AM        Failed - eGFR greater than 59 within last 12 months looking at last value OR  If eGFR is between 45-59 then has patient had a creatinine resulted in past 6 months     EGFR* AFRICAN AMERICAN (mL/min/[1.73m2])   Date Value   08/14/2021 41 (L)     EGFR* NON-AFRICAN AMERICAN (mL/min/[1.73m2])   Date Value   08/14/2021 34 (L)     CREATININE, SERUM (mg/dL)   Date Value   08/14/2021 1.6 (H)       ~~~~~~~~~~~~~~~~~~~~~~~~~~~~~~~~~~~~~~~~~~~~~            Passed - Seen by prescribing provider or same department within the last 12 months or has a future appt in 3 months - IF FAILED PLEASE LOOK AT CHART REVIEW FOR LAST VISIT AND PROCEED ACCORDINGLY     Recent Visits  Date Type Provider Dept   08/10/21 Office Visit Marv Delgado MD [facility] [address] [facility]    06/14/21 V-Visit Harsh Sheriff, CNP [facility] [address] [facility]   05/21/21 Office Visit MAE Del Valle [address] [facility]   05/07/21 V-Visit Harsh Sheriff, CNP [facility] [address] [facility]   03/16/21 Office Visit MAE [facility] [address] [facility]   09/26/20 Office Visit MD Harvinder Abel [address] [facility]   Showing recent visits within past 365 days with a meds authorizing provider and meeting all other requirements  Future Appointments  Date Type Provider Dept   11/10/21 Appointment MD Harvinder Abel [facility] [address] [facility]   Showing future appointments within next 90 days with a meds authorizing provider and meeting all other requirements      ~~~~~~~~~~~~~~~~~~~~~~~~~~~~~~~~~~~~~~~~~~~~~            Passed - Lipid Panel resulted within last 15 months     CHOLESTEROL, TOTAL (mg/dL)   Date Value   03/13/2021 174     HDL CHOLESTEROL (mg/dL)   Date Value   03/13/2021 40 (L)     TRIGLYCERIDES (mg/dL)   Date Value   03/13/2021 280 (H)     LDL CHOLESTEROL (MG/DL)   Date Value   07/06/2015 103 (H)     LDL CHOL, DIRECT MEASUREMENT (mg/dL)   Date Value   03/13/2021 93       ~~~~~~~~~~~~~~~~~~~~~~~~~~~~~~~~~~~~~~~~~~~~~            Passed - Hgb A1c less than 8 within last 12 months looking at last value -- IF CRITERIA FAILED REFER TO PROTOCOL DETAILS     HEMOGLOBIN A1C (%)   Date Value   08/06/2021 6.1 (H)       IF A1c CRITERIA FAILED - provide a courtesy refill x3.    If lab value older than 6 months, order A1c and instruction patient to have labs preform  If lab value is greater than 8, schedule a medication titration appointment  Courtesy refill may not be repeated.    ~~~~~~~~~~~~~~~~~~~~~~~~~~~~~~~~~~~~~~~~~~~~~            Passed - Medication (including dose and sig) on current meds list     Outpatient Current Medications as of as of 8/21/2021       Disp Refills Start End    QUEtiapine (SEROquel) 100 MG tablet 60 tablet 1 8/17/2021     Sig - Route: Take 1 tablet by  mouth nightly. - Oral    Class: Eprescribe    risperiDONE (RisperDAL) 1 MG tablet 30 tablet 0 2021     Sig - Route: Take 1 tablet by mouth daily. - Oral    Class: Eprescribe    pioglitazone (ACTOS) 15 MG tablet 90 tablet 3 2021     Sig - Route: TAKE 1 TABLET BY MOUTH DAILY - Oral    Class: Eprescribe    clonazePAM (KlonoPIN) 1 MG tablet 60 tablet 0 2021     Sig - Route: Take 1 tablet by mouth 2 times daily as needed for Anxiety. Indications: Feeling Anxious - Oral    Class: Eprescribe    B Complex Vitamins (B COMPLEX 1 PO)        Sig - Route: Take 1 tablet by mouth. - Oral    Class: Historical Med    QUEtiapine (SEROquel) 400 MG tablet 60 tablet 3 2021     Sig - Route: Take 2 tablets by mouth nightly. - Oral    Class: Eprescribe    Levomefolate Glucosamine (METHYLFOLATE PO)        Sig - Route: Take 15 mg by mouth. - Oral    Class: Historical Med    atorvastatin (LIPITOR) 40 MG tablet 90 tablet 3 2021     Sig: TAKE 1 TABLET BY MOUTH EVERY NIGHT    Class: Eprescribe    Accu-Chek Cheryl Plus test strip 200 each 0 2021     Sig: Test blood sugar 2  times daily as directed. Diagnosis: E11.22, N18.3. Meter: Cheryl    Class: Eprescribe    Notes to Pharmacy: Collaborating/Supervising MD is Dr. Marv Delgado MD    topiramate (TOPAMAX) 50 MG tablet   2021     Class: Historical Med    SOFTCLIX LANCETS Misc 100 each 3 2021     Si times daily.    Class: Eprescribe    Notes to Pharmacy: Collaborating/Supervising MD is Dr. Marv Delgado MD  Is checking more frequently with dizziness episodes    Belsomra 5 MG tablet   2021     Sig - Route: Take 5 mg by mouth nightly. - Oral    Class: Historical Med    levothyroxine 125 MCG tablet 90 tablet 1 3/31/2021     Sig - Route: TAKE 1 TABLET BY MOUTH DAILY - Oral    Class: Eprescribe    fenofibrate (TRICOR) 54 MG tablet 90 tablet 1 2020     Sig: Take 1 tablet by mouth daily.    Class: Eprescribe    Notes to Pharmacy: To replace 160 mg.     metFORMIN (GLUCOPHAGE-XR) 500 MG 24 hr tablet 180 tablet 1 9/26/2020     Sig - Route: Take 1 tablet by mouth 2 times daily (with meals). - Oral    Class: Eprescribe    Notes to Pharmacy: Dose decrease.    Ascorbic Acid (VITAMIN C) 1000 MG tablet        Sig - Route: Take 1,000 mg by mouth daily. - Oral    Class: Historical Med    vitamin A 40589 units capsule        Sig - Route: Take 10,000 Units by mouth daily. Patient takes 2400 mcg daily - Oral    Class: Historical Med    aspirin (SHANNAN ASPIRIN EC LOW DOSE) 81 MG EC tablet   9/28/2018     Sig - Route: Take 81 mg by mouth daily. - Oral    Class: Historical Med    haloperidol (HALDOL) 5 MG tablet   4/12/2018     Sig - Route: Take 0.5 tablets by mouth as needed. - Oral    Class: Historical Med    propranolol (INDERAL) 20 MG tablet   7/9/2018     Sig - Route: Take 20 mg by mouth 2 times daily. - Oral    Class: Historical Med    vitamin E 400 UNIT capsule   9/28/2018     Sig - Route: Take 400 Units by mouth daily. - Oral    Class: Historical Med          ~~~~~~~~~~~~~~~~~~~~~~~~~~~~~~~~~~~~~~~~~~~~~                1

## 2023-07-23 ENCOUNTER — INPATIENT (INPATIENT)
Facility: HOSPITAL | Age: 28
LOS: 1 days | Discharge: HOME | End: 2023-07-25
Attending: OBSTETRICS & GYNECOLOGY | Admitting: OBSTETRICS & GYNECOLOGY
Payer: MEDICAID

## 2023-07-23 ENCOUNTER — TRANSCRIPTION ENCOUNTER (OUTPATIENT)
Age: 28
End: 2023-07-23

## 2023-07-23 VITALS — HEART RATE: 86 BPM | SYSTOLIC BLOOD PRESSURE: 132 MMHG | DIASTOLIC BLOOD PRESSURE: 90 MMHG

## 2023-07-23 DIAGNOSIS — O26.893 OTHER SPECIFIED PREGNANCY RELATED CONDITIONS, THIRD TRIMESTER: ICD-10-CM

## 2023-07-23 DIAGNOSIS — O26.899 OTHER SPECIFIED PREGNANCY RELATED CONDITIONS, UNSPECIFIED TRIMESTER: ICD-10-CM

## 2023-07-23 LAB
BASOPHILS # BLD AUTO: 0.03 K/UL — SIGNIFICANT CHANGE UP (ref 0–0.2)
BASOPHILS NFR BLD AUTO: 0.3 % — SIGNIFICANT CHANGE UP (ref 0–2)
BLD GP AB SCN SERPL QL: SIGNIFICANT CHANGE UP
EOSINOPHIL # BLD AUTO: 0.06 K/UL — SIGNIFICANT CHANGE UP (ref 0–0.5)
EOSINOPHIL NFR BLD AUTO: 0.5 % — SIGNIFICANT CHANGE UP (ref 0–6)
HCT VFR BLD CALC: 38.7 % — SIGNIFICANT CHANGE UP (ref 34.5–45)
HGB BLD-MCNC: 13.4 G/DL — SIGNIFICANT CHANGE UP (ref 11.5–15.5)
IMM GRANULOCYTES NFR BLD AUTO: 0.3 % — SIGNIFICANT CHANGE UP (ref 0–0.9)
LYMPHOCYTES # BLD AUTO: 2.88 K/UL — SIGNIFICANT CHANGE UP (ref 1–3.3)
LYMPHOCYTES # BLD AUTO: 24 % — SIGNIFICANT CHANGE UP (ref 13–44)
MCHC RBC-ENTMCNC: 31.2 PG — SIGNIFICANT CHANGE UP (ref 27–34)
MCHC RBC-ENTMCNC: 34.6 GM/DL — SIGNIFICANT CHANGE UP (ref 32–36)
MCV RBC AUTO: 90.2 FL — SIGNIFICANT CHANGE UP (ref 80–100)
MONOCYTES # BLD AUTO: 1.03 K/UL — HIGH (ref 0–0.9)
MONOCYTES NFR BLD AUTO: 8.6 % — SIGNIFICANT CHANGE UP (ref 2–14)
NEUTROPHILS # BLD AUTO: 7.95 K/UL — HIGH (ref 1.8–7.4)
NEUTROPHILS NFR BLD AUTO: 66.3 % — SIGNIFICANT CHANGE UP (ref 43–77)
PLATELET # BLD AUTO: 224 K/UL — SIGNIFICANT CHANGE UP (ref 150–400)
RBC # BLD: 4.29 M/UL — SIGNIFICANT CHANGE UP (ref 3.8–5.2)
RBC # FLD: 13.1 % — SIGNIFICANT CHANGE UP (ref 10.3–14.5)
WBC # BLD: 11.98 K/UL — HIGH (ref 3.8–10.5)
WBC # FLD AUTO: 11.98 K/UL — HIGH (ref 3.8–10.5)

## 2023-07-23 PROCEDURE — 59400 OBSTETRICAL CARE: CPT | Mod: U9

## 2023-07-23 RX ORDER — SODIUM CHLORIDE 9 MG/ML
3 INJECTION INTRAMUSCULAR; INTRAVENOUS; SUBCUTANEOUS EVERY 8 HOURS
Refills: 0 | Status: DISCONTINUED | OUTPATIENT
Start: 2023-07-23 | End: 2023-07-25

## 2023-07-23 RX ORDER — LANOLIN
1 OINTMENT (GRAM) TOPICAL EVERY 6 HOURS
Refills: 0 | Status: DISCONTINUED | OUTPATIENT
Start: 2023-07-23 | End: 2023-07-25

## 2023-07-23 RX ORDER — ACETAMINOPHEN 500 MG
3 TABLET ORAL
Qty: 0 | Refills: 0 | DISCHARGE
Start: 2023-07-23

## 2023-07-23 RX ORDER — CHLORHEXIDINE GLUCONATE 213 G/1000ML
1 SOLUTION TOPICAL DAILY
Refills: 0 | Status: DISCONTINUED | OUTPATIENT
Start: 2023-07-23 | End: 2023-07-23

## 2023-07-23 RX ORDER — SODIUM CHLORIDE 9 MG/ML
1000 INJECTION, SOLUTION INTRAVENOUS ONCE
Refills: 0 | Status: COMPLETED | OUTPATIENT
Start: 2023-07-23 | End: 2023-07-23

## 2023-07-23 RX ORDER — AER TRAVELER 0.5 G/1
1 SOLUTION RECTAL; TOPICAL EVERY 4 HOURS
Refills: 0 | Status: DISCONTINUED | OUTPATIENT
Start: 2023-07-23 | End: 2023-07-25

## 2023-07-23 RX ORDER — ACETAMINOPHEN 500 MG
975 TABLET ORAL
Refills: 0 | Status: DISCONTINUED | OUTPATIENT
Start: 2023-07-23 | End: 2023-07-25

## 2023-07-23 RX ORDER — DIPHENHYDRAMINE HCL 50 MG
25 CAPSULE ORAL EVERY 6 HOURS
Refills: 0 | Status: DISCONTINUED | OUTPATIENT
Start: 2023-07-23 | End: 2023-07-25

## 2023-07-23 RX ORDER — TETANUS TOXOID, REDUCED DIPHTHERIA TOXOID AND ACELLULAR PERTUSSIS VACCINE, ADSORBED 5; 2.5; 8; 8; 2.5 [IU]/.5ML; [IU]/.5ML; UG/.5ML; UG/.5ML; UG/.5ML
0.5 SUSPENSION INTRAMUSCULAR ONCE
Refills: 0 | Status: DISCONTINUED | OUTPATIENT
Start: 2023-07-23 | End: 2023-07-25

## 2023-07-23 RX ORDER — CITRIC ACID/SODIUM CITRATE 300-500 MG
15 SOLUTION, ORAL ORAL EVERY 6 HOURS
Refills: 0 | Status: DISCONTINUED | OUTPATIENT
Start: 2023-07-23 | End: 2023-07-23

## 2023-07-23 RX ORDER — PRAMOXINE HYDROCHLORIDE 150 MG/15G
1 AEROSOL, FOAM RECTAL EVERY 4 HOURS
Refills: 0 | Status: DISCONTINUED | OUTPATIENT
Start: 2023-07-23 | End: 2023-07-25

## 2023-07-23 RX ORDER — SIMETHICONE 80 MG/1
80 TABLET, CHEWABLE ORAL EVERY 4 HOURS
Refills: 0 | Status: DISCONTINUED | OUTPATIENT
Start: 2023-07-23 | End: 2023-07-25

## 2023-07-23 RX ORDER — SODIUM CHLORIDE 9 MG/ML
1000 INJECTION, SOLUTION INTRAVENOUS
Refills: 0 | Status: DISCONTINUED | OUTPATIENT
Start: 2023-07-23 | End: 2023-07-23

## 2023-07-23 RX ORDER — HYDROCORTISONE 1 %
1 OINTMENT (GRAM) TOPICAL EVERY 6 HOURS
Refills: 0 | Status: DISCONTINUED | OUTPATIENT
Start: 2023-07-23 | End: 2023-07-25

## 2023-07-23 RX ORDER — IBUPROFEN 200 MG
600 TABLET ORAL EVERY 6 HOURS
Refills: 0 | Status: DISCONTINUED | OUTPATIENT
Start: 2023-07-23 | End: 2023-07-25

## 2023-07-23 RX ORDER — OXYTOCIN 10 UNIT/ML
41.67 VIAL (ML) INJECTION
Qty: 20 | Refills: 0 | Status: DISCONTINUED | OUTPATIENT
Start: 2023-07-23 | End: 2023-07-25

## 2023-07-23 RX ORDER — MAGNESIUM HYDROXIDE 400 MG/1
30 TABLET, CHEWABLE ORAL
Refills: 0 | Status: DISCONTINUED | OUTPATIENT
Start: 2023-07-23 | End: 2023-07-25

## 2023-07-23 RX ORDER — BENZOCAINE 10 %
1 GEL (GRAM) MUCOUS MEMBRANE EVERY 6 HOURS
Refills: 0 | Status: DISCONTINUED | OUTPATIENT
Start: 2023-07-23 | End: 2023-07-25

## 2023-07-23 RX ORDER — OXYCODONE HYDROCHLORIDE 5 MG/1
5 TABLET ORAL
Refills: 0 | Status: DISCONTINUED | OUTPATIENT
Start: 2023-07-23 | End: 2023-07-25

## 2023-07-23 RX ORDER — OXYCODONE HYDROCHLORIDE 5 MG/1
5 TABLET ORAL ONCE
Refills: 0 | Status: DISCONTINUED | OUTPATIENT
Start: 2023-07-23 | End: 2023-07-25

## 2023-07-23 RX ORDER — IBUPROFEN 200 MG
1 TABLET ORAL
Qty: 0 | Refills: 0 | DISCHARGE
Start: 2023-07-23

## 2023-07-23 RX ORDER — OXYTOCIN 10 UNIT/ML
333.33 VIAL (ML) INJECTION
Qty: 20 | Refills: 0 | Status: DISCONTINUED | OUTPATIENT
Start: 2023-07-23 | End: 2023-07-25

## 2023-07-23 RX ORDER — DIBUCAINE 1 %
1 OINTMENT (GRAM) RECTAL EVERY 6 HOURS
Refills: 0 | Status: DISCONTINUED | OUTPATIENT
Start: 2023-07-23 | End: 2023-07-25

## 2023-07-23 RX ORDER — KETOROLAC TROMETHAMINE 30 MG/ML
30 SYRINGE (ML) INJECTION ONCE
Refills: 0 | Status: DISCONTINUED | OUTPATIENT
Start: 2023-07-23 | End: 2023-07-23

## 2023-07-23 RX ORDER — IBUPROFEN 200 MG
600 TABLET ORAL EVERY 6 HOURS
Refills: 0 | Status: COMPLETED | OUTPATIENT
Start: 2023-07-23 | End: 2024-06-20

## 2023-07-23 RX ADMIN — Medication 15 MILLILITER(S): at 03:02

## 2023-07-23 RX ADMIN — SODIUM CHLORIDE 3 MILLILITER(S): 9 INJECTION INTRAMUSCULAR; INTRAVENOUS; SUBCUTANEOUS at 21:22

## 2023-07-23 RX ADMIN — Medication 975 MILLIGRAM(S): at 20:45

## 2023-07-23 RX ADMIN — SODIUM CHLORIDE 2000 MILLILITER(S): 9 INJECTION, SOLUTION INTRAVENOUS at 02:51

## 2023-07-23 RX ADMIN — Medication 30 MILLIGRAM(S): at 05:15

## 2023-07-23 RX ADMIN — Medication 1000 MILLIUNIT(S)/MIN: at 04:57

## 2023-07-23 RX ADMIN — Medication 975 MILLIGRAM(S): at 09:45

## 2023-07-23 RX ADMIN — Medication 975 MILLIGRAM(S): at 10:45

## 2023-07-23 RX ADMIN — Medication 30 MILLIGRAM(S): at 05:41

## 2023-07-23 RX ADMIN — SODIUM CHLORIDE 125 MILLILITER(S): 9 INJECTION, SOLUTION INTRAVENOUS at 03:39

## 2023-07-23 RX ADMIN — Medication 600 MILLIGRAM(S): at 17:28

## 2023-07-23 RX ADMIN — SODIUM CHLORIDE 3 MILLILITER(S): 9 INJECTION INTRAMUSCULAR; INTRAVENOUS; SUBCUTANEOUS at 13:45

## 2023-07-23 RX ADMIN — Medication 600 MILLIGRAM(S): at 23:13

## 2023-07-23 NOTE — OB RN DELIVERY SUMMARY - NSSELHIDDEN_OBGYN_ALL_OB_FT
[NS_DeliveryRN_OBGYN_ALL_OB_FT:[id number]] [NS_DeliveryRN_OBGYN_ALL_OB_FT:[id number]],[NS_DeliveryAttending1_OBGYN_ALL_OB_FT:[id number]=]

## 2023-07-23 NOTE — DISCHARGE NOTE OB - PATIENT PORTAL LINK FT
You can access the FollowMyHealth Patient Portal offered by [facility] by registering at the following website: [email or web address]. By joining [facility]’s FollowMyHealth portal, you will also be able to view your health information using other applications (apps) compatible with our system.

## 2023-07-23 NOTE — OB PROVIDER DELIVERY SUMMARY - NSSELHIDDEN_OBGYN_ALL_OB_FT
[NS_DeliveryRN_OBGYN_ALL_OB_FT:[id number]],[NS_DeliveryAttending1_OBGYN_ALL_OB_FT:[id number]]

## 2023-07-23 NOTE — OB PROVIDER DELIVERY SUMMARY - NSPROVIDERDELIVERYNOTE_OBGYN_ALL_OB_FT
Vaginal Delivery Summary    Procedure: Normal spontaneous vaginal delivery @ 39w6d  Findings: Viable female infant delivered in cephalic presentation at 04:57, placenta delivered at 05:02  Apgar scores- 8/9  Weight will be recorded after 1 hour to allow for skin-to-skin contact.  Laceration(s): 2 degree perineal  Repair: 3-0 vycril  EBL: 100 cc  Complications: none    Procedure:   Patient felt rectal pressure and was found to be fully dilated, +2 station. She delivered a viable female infant at RAZIA position. Delayed cord clamping was performed for  30 seconds. Placenta delivered intact and pitocin was started at the delivery of 05:02. Perineum and vagina were inspected 2 degree laceration, repaired with 3-0 vycril. Adequate hemostasis was obtained. Mother and baby bonded, skin to skin was performed and breast feeding was initiated.    Attending Dr Polanco was available on the L&D

## 2023-07-23 NOTE — OB PROVIDER H&P - ASSESSMENT
A/P:   -Admit to L&D for labor  -Consent  -Admission labs  -NPO, except ice chips   -IV fluids  -Labor: Intact. Latent labor. [place] q3 min.   -Fetus: Cat I tracing. Continuous toco and fetal monitoring.   -GBS: Negative, no GBS ppx required   -Analgesia: Epidural  -DVT ppx: Ambulate and SCD's while in bed     Discussed with Dr. Polanco

## 2023-07-23 NOTE — DISCHARGE NOTE OB - CARE PLAN
Principal Discharge DX:	Vaginal delivery  Assessment and plan of treatment:	Please call your provider in 1 week. Take medications as directed, regular diet, activity as tolerated. Exclusive breast feeding for the first 6 months is recommended. Nothing per vagina for 6 weeks (incl. sex, douching, etc). If you have additional concerns, please inform your provider.   1

## 2023-07-23 NOTE — OB PROVIDER LABOR PROGRESS NOTE - NS_SUBJECTIVE/OBJECTIVE_OBGYN_ALL_OB_FT
Patient seen and examined during prolonged deceleration. Patient noted to be hypotensive. CRNA called for ephedrine.

## 2023-07-23 NOTE — OB PROVIDER H&P - NSHPPHYSICALEXAM_GEN_ALL_CORE
Vital Signs Last 24 Hrs    HR: 109 (23 Jul 2023 04:05) (79 - 109)  BP: 146/69 (23 Jul 2023 04:05) (76/44 - 146/69)  RR: 18 (23 Jul 2023 02:31) (18 - 18)  SpO2: 100% (23 Jul 2023 04:04) (96% - 100%)    Gen: NAD, well-appearing  Abd: soft, gravid  SVE: 4/50/-3  FHT: 140 bpm, moderate variability, + accel, - decel  [place]: q2-3 min

## 2023-07-23 NOTE — DISCHARGE NOTE OB - MEDICATION SUMMARY - MEDICATIONS TO TAKE
I will START or STAY ON the medications listed below when I get home from the hospital:    ibuprofen 600 mg oral tablet  -- 1 tab(s) by mouth every 6 hours  -- Indication: For Pain    acetaminophen 325 mg oral tablet  -- 3 tab(s) by mouth every 6 hours  -- Indication: For Pain    Prenatal Multivitamins with Folic Acid 1 mg oral tablet  -- 1 tab(s) by mouth once a day  -- Indication: For postpartum recovery

## 2023-07-23 NOTE — DISCHARGE NOTE OB - CARE PROVIDER_API CALL
[facility]  [address] [place] [address], [address]  [place] [address]  Phone: [phone]  Fax: [phone]  Established Patient  Follow Up Time: 1 week

## 2023-07-23 NOTE — OB PROVIDER H&P - HISTORY OF PRESENT ILLNESS
TALA STALLWORTH is a 28y  at 39w3d GA who presents to L&D for painful contractions. LMP 10/17/22, DREW 23. Patient denies leakage of fluids, vaginal bleeding. Patient endorses good fetal movement  Pt denies vaginal bleeding, contractions and leakage of fluid.     Pt denies visual disturbances, RUQ pain, epigastric pain and new-onset edema.   She denies fevers, chills, nausea, vomiting.   She denies shortness of breath, chest pain, and palpitations.    Pregnancy course is uncomplicated    POB:  G1  @40w (male, 7lb8oz, )  PGYN: +fibroids/-cysts, denied STD hx, denies abnormal PAPs  PMH: Denies  PSH: Denies  SH: Denies tobacco use, EtOH use and illicit drug use during the pregnancy; Feels safe at home  Meds: PNVs  All: NKDA

## 2023-07-23 NOTE — DISCHARGE NOTE OB - MATERIALS PROVIDED
Vaccinations/[facility] [place] Screening Program/[place]  Immunization Record/Breastfeeding Log/Breastfeeding Mother’s Support Group Information/Guide to Postpartum Care/[facility] Screen Program/Back To Sleep Handout/Shaken Baby Prevention Handout/Breastfeeding Guide and Packet/Birth Certificate Instructions/Discharge Medication Information for Patients and Families Pocket Guide/MMR Vaccination (VIS Pub Date: 2012)/Tdap Vaccination (VIS Pub Date: 2012)

## 2023-07-23 NOTE — OB PROVIDER DELIVERY SUMMARY - NSLOWPPHRISK_OBGYN_A_OB
No previous uterine incision/[place] Pregnancy/Less than or equal to 4 previous vaginal births/No known bleeding disorder/No history of postpartum hemorrhage/No other PPH risks indicated

## 2023-07-23 NOTE — OB RN PATIENT PROFILE - NS_ZIKATRAVEL_OBGYN_ALL_OB
History     Chief Complaint   Patient presents with     Diarrhea     HPI  Terry Omalley is a 9 day old male who presents to the emergency room today with mom with concerns of diarrhea.  Patient is a 9 day-old male, term born via vaginal delivery without complications.  Patient is bottle fed with breast milk with small amounts of formula.  Mom is also concerned that patient has a diaper rash that has been bleeding during diaper changes.  Patient has had no spitting up or vomiting.  Mom is no change in diet recently, patient has not had a fever.  Mom reports that they were using Desitin and switch to Aquaphor for diaper rash.    Allergies:  No Known Allergies    Problem List:    Patient Active Problem List    Diagnosis Date Noted     Normal  (single liveborn) 2022     Priority: Medium        Past Medical History:    Past Medical History:   Diagnosis Date     No known health problems        Past Surgical History:    Past Surgical History:   Procedure Laterality Date     NO HISTORY OF SURGERY         Family History:    Family History   Problem Relation Age of Onset     No Known Problems Mother      No Known Problems Father      Aneurysm Maternal Grandmother      Fibromyalgia Maternal Grandmother      Unknown/Adopted Maternal Grandfather      Mental Illness Paternal Grandmother      No Known Problems Paternal Grandfather        Social History:  Marital Status:  Single [1]  Social History     Tobacco Use     Smoking status: Never Smoker     Smokeless tobacco: Never Used   Substance Use Topics     Alcohol use: None     Drug use: None        Medications:    No current outpatient medications on file.        Review of Systems   All other systems reviewed and are negative.      Physical Exam   Heart Rate: 168  Temp: 98.4  F (36.9  C)  Resp: 40  Weight: 3.572 kg (7 lb 14 oz)  SpO2: 98 %      Physical Exam  Constitutional:       General: He is active. He is not in acute distress.     Appearance: Normal  appearance. He is well-developed. He is not toxic-appearing.   HENT:      Head: Normocephalic.      Mouth/Throat:      Mouth: Mucous membranes are moist.   Eyes:      Extraocular Movements: Extraocular movements intact.      Conjunctiva/sclera: Conjunctivae normal.   Neck:      Musculoskeletal: Normal range of motion.   Cardiovascular:      Rate and Rhythm: Normal rate.      Pulses: Normal pulses.   Pulmonary:      Effort: Pulmonary effort is normal.      Breath sounds: Normal breath sounds.   Abdominal:      General: Bowel sounds are normal.      Palpations: Abdomen is soft.   Genitourinary:     Penis: Normal and uncircumcised.    Musculoskeletal: Normal range of motion.   Skin:     General: Skin is warm.      Capillary Refill: Capillary refill takes less than 2 seconds.      Turgor: Normal.      Comments: Diaper rash, no evidence of fungal component   Neurological:      General: No focal deficit present.      Mental Status: He is alert.      Motor: No abnormal muscle tone.      Primitive Reflexes: Suck normal. Symmetric [place].         ED Course     Procedures    No results found for this or any previous visit (from the past 24 hour(s)).    Medications - No data to display    Assessments & Plan (with Medical Decision Making)  Diaper rash in   Mom was given zinc oxide here and instructed to mix with Aquaphor at home hopefully for a more effective solution, she was encouraged to make sure she changes diapers very frequently.  Patient has no vomiting no fever and mom was educated regarding the amount of seemingly loose stools that newborns can have especially when breast-fed in the first few days of life.  Patient on exam here is well-hydrated, he looks great, there is no worrisome findings.  There was a small amount of stool in newborns diaper that was very consistent with breast-fed stool.  Mom was reassured.  Patient can follow-up with primary care as scheduled.  Of note patient has gained 4 ounces since his  last  check.  Reasons to return to the emergency room were discussed.  Mom is agreeable to plan of care and patient was discharged in stable condition.     I have reviewed the nursing notes.    I have reviewed the findings, diagnosis, plan and need for follow up with the patient.    New Prescriptions    No medications on file       Final diagnoses:    problem   Diaper rash       2020   [facility] EMERGENCY DEPARTMENT     Cassia Vaz, IRINA CNP  [phone]     No

## 2023-07-23 NOTE — DISCHARGE NOTE OB - ADDITIONAL INSTRUCTIONS
Discussed with patient to make appointment in 2 weeks for postpartum visit with Cata Sequeira Midwife

## 2023-07-23 NOTE — OB RN PATIENT PROFILE - RUBELLA: DATE, OB PROFILE
Current Status      Updated on: 6/15/2022 10:30 AM    Name Date Status Dose VIS Date Route Site  Lot# Given By Verified By   Tdap (Adacel,Boostrix) 6/15/2022 Given 0.5 mL 08/06/2021, Given Today IM Lower Left D [employer] [id number] Elizabeth Gongora CMA --   Exp. Date NDC # Product Time Location External Comment   9/28/2023 20709-128-78 Adacel 10:21 AM -- -- --   Updated by: Elizabeth Gongora CMA           22-Dec-2022

## 2023-07-23 NOTE — DISCHARGE NOTE OB - NS MD DC FALL RISK RISK
For information on Fall & Injury Prevention, visit: [email or web address] OR  [email or web address] OR  https://www.cdc.gov/steadi/patient.html

## 2023-07-23 NOTE — OB RN DELIVERY SUMMARY - NS_SEPSISRSKCALC_OBGYN_ALL_OB_FT
No temperature has been documented for this patient in CPN or on the OB Flowsheet. Ensure the highest temperature during labor was documented on the OB Flowsheet.  No gestational age at birth has been documented. Ensure delivery date/time has been entered above.  Rupture of membranes must be entered above.   EOS calculated successfully. EOS Risk Factor: 0.5/1000 live births ([facility] national incidence); GA=39w6d; Temp=98.42; ROM=1.017; GBS='Negative'; Antibiotics='No antibiotics or any antibiotics < 2 hrs prior to birth'

## 2023-07-23 NOTE — OB PROVIDER H&P - ATTENDING COMMENTS
28y  at 39w3d admitted for spontaneous rupture of membranes.   - admission labs and COVID testing pending   - FHT: reactive and reassuring   - Banner: with irregular activity   - vertex   - GBS negative, no antibiotic prophylaxis indicated   - mild discomfort, does not currently desire analgesia   - continuous EFM/Banner until delivery   - vaginal delivery anticipated   - pitocin IV for augmentation if clinically indicated

## 2023-07-23 NOTE — DISCHARGE NOTE OB - HOSPITAL COURSE
She presented for a normal spontaneous vaginal delivery. She had an uncomplicated delivery and postpartum course. Upon discharge she was passing gas, tolerating PO, ambulating, and voiding spontaneously.

## 2023-07-23 NOTE — DISCHARGE NOTE OB - PLAN OF CARE
Please call your provider in 1 week. Take medications as directed, regular diet, activity as tolerated. Exclusive breast feeding for the first 6 months is recommended. Nothing per vagina for 6 weeks (incl. sex, douching, etc). If you have additional concerns, please inform your provider.

## 2023-07-23 NOTE — OB NEONATOLOGY/PEDIATRICIAN DELIVERY SUMMARY - NSPEDSNEONOTESA_OBGYN_ALL_OB_FT
OB requested me to attend  at 39.6 weeks due to MSAF. The mom is 27 y/o, , O+, PNL Neg & immune.  L & D: SROM @ 0322, MSAF, vigorous, suctioned and dried, APGAR 8 & 9  Asst: FTLGA, BG, , MSAF  Plan: observe in transition, if remain stable admit to NBN

## 2023-07-24 LAB
HCT VFR BLD CALC: 38.8 % — SIGNIFICANT CHANGE UP (ref 34.5–45)
HGB BLD-MCNC: 12.9 G/DL — SIGNIFICANT CHANGE UP (ref 11.5–15.5)
T PALLIDUM AB TITR SER: NEGATIVE — SIGNIFICANT CHANGE UP

## 2023-07-24 RX ADMIN — Medication 1 TABLET(S): at 12:44

## 2023-07-24 RX ADMIN — Medication 600 MILLIGRAM(S): at 18:01

## 2023-07-24 RX ADMIN — Medication 975 MILLIGRAM(S): at 08:33

## 2023-07-24 RX ADMIN — Medication 975 MILLIGRAM(S): at 14:50

## 2023-07-24 RX ADMIN — Medication 600 MILLIGRAM(S): at 12:04

## 2023-07-24 RX ADMIN — Medication 975 MILLIGRAM(S): at 02:44

## 2023-07-24 RX ADMIN — Medication 975 MILLIGRAM(S): at 21:42

## 2023-07-24 RX ADMIN — Medication 600 MILLIGRAM(S): at 23:58

## 2023-07-25 ENCOUNTER — NON-APPOINTMENT (OUTPATIENT)
Age: 28
End: 2023-07-25

## 2023-07-25 VITALS
SYSTOLIC BLOOD PRESSURE: 109 MMHG | OXYGEN SATURATION: 97 % | DIASTOLIC BLOOD PRESSURE: 72 MMHG | TEMPERATURE: 98 F | RESPIRATION RATE: 17 BRPM | HEART RATE: 79 BPM

## 2023-07-25 PROCEDURE — 86901 BLOOD TYPING SEROLOGIC RH(D): CPT

## 2023-07-25 PROCEDURE — 36415 COLL VENOUS BLD VENIPUNCTURE: CPT

## 2023-07-25 PROCEDURE — 86850 RBC ANTIBODY SCREEN: CPT

## 2023-07-25 PROCEDURE — 85014 HEMATOCRIT: CPT

## 2023-07-25 PROCEDURE — T1013: CPT

## 2023-07-25 PROCEDURE — 86900 BLOOD TYPING SEROLOGIC ABO: CPT

## 2023-07-25 PROCEDURE — 85025 COMPLETE CBC W/AUTO DIFF WBC: CPT

## 2023-07-25 PROCEDURE — 85018 HEMOGLOBIN: CPT

## 2023-07-25 PROCEDURE — 90707 MMR VACCINE SC: CPT

## 2023-07-25 PROCEDURE — 86780 TREPONEMA PALLIDUM: CPT

## 2023-07-25 RX ADMIN — Medication 0.5 MILLILITER(S): at 11:57

## 2023-07-25 RX ADMIN — Medication 975 MILLIGRAM(S): at 09:02

## 2023-07-25 RX ADMIN — Medication 975 MILLIGRAM(S): at 03:44

## 2023-07-25 RX ADMIN — Medication 600 MILLIGRAM(S): at 11:56

## 2023-07-25 RX ADMIN — Medication 1 TABLET(S): at 11:56

## 2023-07-25 NOTE — PROGRESS NOTE ADULT - SUBJECTIVE AND OBJECTIVE BOX
TALA STALLWORTH is a 28y  now PPD#2 s/p spontaneous vaginal delivery at 39 weeks gestation, uncomplicated.    S:    The patient has no complaints.  Pain controlled with current treatment regimen.   She is ambulating without difficulty and tolerating PO.   + flatus/-BM/+ voiding   She endorses appropriate lochia, which is decreasing.   She is breastfeeding without difficulty.   Denies HA, dizziness, SOB, swelling    O:    Vital Signs Last 24 Hrs  T(C): 36.7 (2023 03:44), Max: 36.8 (2023 15:27)  T(F): 98 (2023 03:44), Max: 98.2 (2023 15:27)  HR: 79 (2023 03:44) (74 - 79)  BP: 109/72 (2023 03:44) (109/72 - 111/67)  RR: 17 (2023 03:44) (17 - 18)  SpO2: 97% (2023 03:44) (97% - 100%)      Gen: NAD, AOx3  Breast: Nontender, non-engorged   Abdomen:  Soft, non-tender, non-distended  Uterus:  Fundus firm below umbilicus  VE:  +Lochia  Ext:  Non-tender and non-edematous                            12.9   x     )-----------( x        ( 2023 05:00 )             38.8 
TALA STALLWORTH is a 28y  now PPD#1 s/p spontaneous vaginal delivery at 39 weeks gestation, uncomplicated.    S:    The patient has no complaints.  Pain controlled with current treatment regimen.   She is ambulating without difficulty and tolerating PO.   + flatus/-BM/+ voiding   She endorses appropriate lochia, which is decreasing.   She is breastfeeding without difficulty.   Denies HA, dizziness, SOB, swelling    O:    T(C): 36.6 (23 @ 05:49), Max: 36.8 (23 @ 07:05)  HR: 73 (23 @ 05:49) (73 - 105)  BP: 108/73 (23 @ 05:49) (106/69 - 121/64)  RR: 16 (23 @ 05:49) (16 - 18)  SpO2: 98% (23 @ 05:49) (97% - 100%)    Gen: NAD, AOx3  Breast: Nontender, non-engorged   Abdomen:  Soft, non-tender, non-distended  Uterus:  Fundus firm below umbilicus  VE:  +Lochia  Ext:  Non-tender and non-edematous                          13.4   11.98 )-----------( 224      ( 2023 02:41 )             38.7

## 2023-07-25 NOTE — PROGRESS NOTE ADULT - ASSESSMENT
A/P:  TALA STALLWORTH is a 28y  now PPD#1 s/p spontaneous vaginal delivery at 39 weeks gestation, uncomplicated.    -Vital signs stable  -Hgb: 13.4 -> AM labs pending   -Voiding, tolerating PO, bowel function nml   -Advance care as tolerated   -Continue routine postpartum care and education  -Healthy female infant  -Dispo: Pt is stable for discharge to home pending attending approval.
A/P:  TALA STALLWORTH is a 28y  now PPD#1 s/p spontaneous vaginal delivery at 39 weeks gestation, uncomplicated.    -Vital signs stable  -Hgb: 13.4 -> 12.9  -Voiding, tolerating PO, bowel function nml   -Advance care as tolerated   -Continue routine postpartum care and education  -Healthy female infant at bedside  -Dispo: Pt is stable for discharge to home pending attending approval.

## 2023-07-25 NOTE — PROGRESS NOTE ADULT - ATTENDING COMMENTS
28y  now stable PPD#1 s/p spontaneous vaginal delivery at 39 weeks gestation
Doing well  Plan for discharge

## 2023-08-09 ENCOUNTER — APPOINTMENT (OUTPATIENT)
Dept: OBGYN | Facility: CLINIC | Age: 28
End: 2023-08-09

## 2023-08-14 PROBLEM — Z78.9 OTHER SPECIFIED HEALTH STATUS: Chronic | Status: ACTIVE | Noted: 2023-07-23

## 2023-09-06 ENCOUNTER — APPOINTMENT (OUTPATIENT)
Dept: OBGYN | Facility: CLINIC | Age: 28
End: 2023-09-06
Payer: MEDICAID

## 2023-09-06 VITALS
WEIGHT: 162.44 LBS | DIASTOLIC BLOOD PRESSURE: 70 MMHG | HEIGHT: 63 IN | SYSTOLIC BLOOD PRESSURE: 100 MMHG | BODY MASS INDEX: 28.78 KG/M2

## 2023-09-06 DIAGNOSIS — Z86.19 PERSONAL HISTORY OF OTHER INFECTIOUS AND PARASITIC DISEASES: ICD-10-CM

## 2023-09-06 DIAGNOSIS — Z3A.39 39 WEEKS GESTATION OF PREGNANCY: ICD-10-CM

## 2023-09-06 DIAGNOSIS — Z30.09 ENCOUNTER FOR OTHER GENERAL COUNSELING AND ADVICE ON CONTRACEPTION: ICD-10-CM

## 2023-09-06 DIAGNOSIS — Z87.898 PERSONAL HISTORY OF OTHER SPECIFIED CONDITIONS: ICD-10-CM

## 2023-09-06 DIAGNOSIS — Z34.93 ENCOUNTER FOR SUPERVISION OF NORMAL PREGNANCY, UNSPECIFIED, THIRD TRIMESTER: ICD-10-CM

## 2023-09-06 DIAGNOSIS — Z86.2 PERSONAL HISTORY OF DISEASES OF THE BLOOD AND BLOOD-FORMING ORGANS AND CERTAIN DISORDERS INVOLVING THE IMMUNE MECHANISM: ICD-10-CM

## 2023-09-06 PROCEDURE — 0503F POSTPARTUM CARE VISIT: CPT

## 2023-09-06 NOTE — HISTORY OF PRESENT ILLNESS
[Postpartum Follow Up] : postpartum follow up [Last Pap Date: ___] : Last Pap Date: [unfilled] [Delivery Date: ___] : on [unfilled] [] : delivered by vaginal delivery [Female] : Delivery History: baby girl [Breastfeeding] : currently nursing [Intended Contraception] : Intended Contraception: [IUD] : intrauterine device [Back to Normal] : is back to normal in size [None] : no vaginal bleeding [Normal] : the vagina was normal [Examination Of The Breasts] : breasts are normal [Doing Well] : is doing well [No Sign of Infection] : is showing no signs of infection [Excellent Pain Control] : has excellent pain control [Complications:___] : no complications [BTL] : no tubal ligation [BF with Difficulty] : nursing without difficulty [Resumed Menses] : has not resumed her menses [Resumed [place]] : has not resumed intercourse [FreeTextEntry8] :  6 week postpartum visit [FreeTextEntry9] : uncomplicated prenatal care [de-identified] : exclusively breastfeeding [de-identified] : no symtomps [de-identified] : none [de-identified] : normal physical exam, laceration site healing [de-identified] : Normal 6 week postpartum visit, negative PP depression screen, baby doign well, IUD mirena for contraception [de-identified] : Discussed LARC vs SARC contraceptive methods with pt, IUD for contraception in 4 weeks

## 2023-10-05 DIAGNOSIS — Z34.90 ENCOUNTER FOR SUPERVISION OF NORMAL PREGNANCY, UNSPECIFIED, UNSPECIFIED TRIMESTER: ICD-10-CM

## 2023-10-05 DIAGNOSIS — Z23 ENCOUNTER FOR IMMUNIZATION: ICD-10-CM

## 2023-12-19 ENCOUNTER — APPOINTMENT (OUTPATIENT)
Dept: OBGYN | Facility: CLINIC | Age: 28
End: 2023-12-19
Payer: MEDICAID

## 2023-12-19 VITALS
SYSTOLIC BLOOD PRESSURE: 122 MMHG | DIASTOLIC BLOOD PRESSURE: 70 MMHG | BODY MASS INDEX: 29.41 KG/M2 | WEIGHT: 166 LBS | HEIGHT: 63 IN

## 2023-12-19 DIAGNOSIS — Z12.4 ENCOUNTER FOR SCREENING FOR MALIGNANT NEOPLASM OF CERVIX: ICD-10-CM

## 2023-12-19 DIAGNOSIS — N89.8 OTHER SPECIFIED NONINFLAMMATORY DISORDERS OF VAGINA: ICD-10-CM

## 2023-12-19 PROCEDURE — 99395 PREV VISIT EST AGE 18-39: CPT

## 2023-12-19 RX ORDER — METRONIDAZOLE 7.5 MG/G
0.75 GEL VAGINAL
Qty: 1 | Refills: 0 | Status: ACTIVE | COMMUNITY
Start: 2023-12-19 | End: 1900-01-01

## 2023-12-19 NOTE — HISTORY OF PRESENT ILLNESS
[Y] : Positive pregnancy history [Regular Cycle Intervals] : periods have been regular [Frequency: Q ___ days] : menstrual periods occur approximately every [unfilled] days [Menarche Age: ____] : age at menarche was [unfilled] [[facility] Corporation[facility]FullTerm] : 2 [PGHxTotal] : 2 [PGHxPremature] : 0 [PGHxAbortions] : 0 [PGHxABInduced] : 0 [[facility] HealthxLiving] : 2 [PGHxABSpont] : 0 [PGHxMultBirths] : 0 [PGHxEctopic] : 0

## 2023-12-19 NOTE — PHYSICAL EXAM
[Appropriately responsive] : appropriately responsive [Alert] : alert [No Acute Distress] : no acute distress [Regular Rate Rhythm] : regular rate rhythm [Soft] : soft [Non-tender] : non-tender [Non-distended] : non-distended [No HSM] : No HSM [No Lesions] : no lesions [No Mass] : no mass [Oriented x3] : oriented x3 [Examination Of The Breasts] : a normal appearance [No Masses] : no breast masses were palpable [Labia Majora] : normal [Labia Minora] : normal [Discharge] : a  ~M vaginal discharge was present [Moderate] : moderate [Foul Smelling] : foul smelling [White] : white [Thin] : thin [Normal] : normal [Uterine Adnexae] : normal

## 2024-01-03 LAB
C TRACH RRNA SPEC QL NAA+PROBE: NOT DETECTED
CYTOLOGY CVX/VAG DOC THIN PREP: NORMAL
HPV HIGH+LOW RISK DNA PNL CVX: NOT DETECTED
N GONORRHOEA RRNA SPEC QL NAA+PROBE: NOT DETECTED
SOURCE TP AMPLIFICATION: NORMAL

## 2024-04-02 ENCOUNTER — APPOINTMENT (OUTPATIENT)
Dept: OBGYN | Facility: CLINIC | Age: 29
End: 2024-04-02
Payer: MEDICAID

## 2024-04-02 VITALS
BODY MASS INDEX: 28.39 KG/M2 | HEIGHT: 63 IN | DIASTOLIC BLOOD PRESSURE: 75 MMHG | SYSTOLIC BLOOD PRESSURE: 114 MMHG | WEIGHT: 160.2 LBS | HEART RATE: 91 BPM

## 2024-04-02 DIAGNOSIS — Z32.02 ENCOUNTER FOR PREGNANCY TEST, RESULT NEGATIVE: ICD-10-CM

## 2024-04-02 DIAGNOSIS — Z30.430 ENCOUNTER FOR INSERTION OF INTRAUTERINE CONTRACEPTIVE DEVICE: ICD-10-CM

## 2024-04-02 LAB
HCG UR QL: NEGATIVE
QUALITY CONTROL: YES

## 2024-04-02 PROCEDURE — 81025 URINE PREGNANCY TEST: CPT

## 2024-04-02 PROCEDURE — 58300 INSERT INTRAUTERINE DEVICE: CPT

## 2024-04-02 NOTE — PLAN
[FreeTextEntry1] : PLAN  Discussed r/b/a of IUD warning signs dsicussed  Pelvis sono scheduled  RTO 6 weeks

## 2024-04-02 NOTE — PROCEDURE
[IUD Placement] : intrauterine device (IUD) placement [Time out performed] : Pre-procedure time out performed.  Patient's name, date of birth and procedure confirmed. [Consent Obtained] : Consent obtained [Prevention of Pregnancy] : prevention of pregnancy [Risks] : risks [Benefits] : benefits [Alternatives] : alternatives [Patient] : patient [Infection] : infection [Bleeding] : bleeding [Pain] : pain [Expulsion] : expulsion [Failure] : failure [Uterine Perforation] : uterine perforation [Neg Pregnancy Test] : negative pregnancy test [Betadine] : Betadine [Tenaculum] : Tenaculum [Sounded to ___ cm] : sounded to [unfilled] ~Ucm [Mirena IUD] : Mirena IUD [Tolerated Well] : Patient tolerated the procedure well [No Complications] : No complications [None] : None [LMPDate] : 04/01/2024 [de-identified] : [id number] [de-identified] : 01/2026

## 2024-05-01 DIAGNOSIS — N92.3 OVULATION BLEEDING: ICD-10-CM

## 2024-05-01 RX ORDER — DESOGESTREL AND ETHINYL ESTRADIOL 0.15-0.03
0.15-3 KIT ORAL
Qty: 28 | Refills: 1 | Status: ACTIVE | COMMUNITY
Start: 2024-05-01 | End: 1900-01-01

## 2024-05-15 ENCOUNTER — ASOB RESULT (OUTPATIENT)
Age: 29
End: 2024-05-15

## 2024-05-15 ENCOUNTER — APPOINTMENT (OUTPATIENT)
Dept: ANTEPARTUM | Facility: CLINIC | Age: 29
End: 2024-05-15
Payer: MEDICAID

## 2024-05-15 ENCOUNTER — APPOINTMENT (OUTPATIENT)
Dept: OBGYN | Facility: CLINIC | Age: 29
End: 2024-05-15
Payer: MEDICAID

## 2024-05-15 VITALS
WEIGHT: 197 LBS | BODY MASS INDEX: 34.91 KG/M2 | DIASTOLIC BLOOD PRESSURE: 71 MMHG | HEIGHT: 63 IN | SYSTOLIC BLOOD PRESSURE: 109 MMHG

## 2024-05-15 DIAGNOSIS — Z97.5 PRESENCE OF (INTRAUTERINE) CONTRACEPTIVE DEVICE: ICD-10-CM

## 2024-05-15 PROCEDURE — 76856 US EXAM PELVIC COMPLETE: CPT | Mod: 59

## 2024-05-15 PROCEDURE — 76830 TRANSVAGINAL US NON-OB: CPT

## 2024-05-15 PROCEDURE — 99213 OFFICE O/P EST LOW 20 MIN: CPT

## 2024-05-15 NOTE — HISTORY OF PRESENT ILLNESS
[FreeTextEntry1] : 26yo  presents to check IUD strings and discuss pelvic sono to assess for the IUD position. Patient has Mirena IUD inserted 24. Reports she is overall happy with method. States the only concern is the irregular spotting or if her menses prolong to more than a week. States she used the [facility] OCP as prescribed 2 weeks ago and it helped it with the intermenstrual bleeding she was having.  LMP

## 2024-05-15 NOTE — COUNSELING
[Nutrition/ Exercise/ Weight Management] : nutrition, exercise, weight management [Body Image] : body image [Vitamins/Supplements] : vitamins/supplements [Breast Self Exam] : breast self exam [Confidentiality] : confidentiality

## 2024-06-17 NOTE — OB RN DELIVERY SUMMARY - NS_DELIVERYROOM_OBGYN_ALL_OB_FT
Detail Level: Zone lori STEPHEN Plan: Continue Ketoconazole shampoo 2% - shampoo twice a week (can use on ears) \\nHead and shoulders shampoo (samples given) when not using ketoconazole shampoo Render In Strict Bullet Format?: No
